# Patient Record
Sex: FEMALE | Race: WHITE | NOT HISPANIC OR LATINO | Employment: UNEMPLOYED | URBAN - METROPOLITAN AREA
[De-identification: names, ages, dates, MRNs, and addresses within clinical notes are randomized per-mention and may not be internally consistent; named-entity substitution may affect disease eponyms.]

---

## 2022-08-29 ENCOUNTER — OFFICE VISIT (OUTPATIENT)
Dept: FAMILY MEDICINE CLINIC | Facility: CLINIC | Age: 62
End: 2022-08-29

## 2022-08-29 VITALS
BODY MASS INDEX: 19.5 KG/M2 | RESPIRATION RATE: 20 BRPM | TEMPERATURE: 97.8 F | WEIGHT: 114.2 LBS | OXYGEN SATURATION: 96 % | HEIGHT: 64 IN | SYSTOLIC BLOOD PRESSURE: 130 MMHG | HEART RATE: 90 BPM | DIASTOLIC BLOOD PRESSURE: 80 MMHG

## 2022-08-29 DIAGNOSIS — F17.210 CIGARETTE SMOKER: ICD-10-CM

## 2022-08-29 DIAGNOSIS — M25.552 LEFT HIP PAIN: Primary | ICD-10-CM

## 2022-08-29 DIAGNOSIS — Z12.11 SCREEN FOR COLON CANCER: ICD-10-CM

## 2022-08-29 PROCEDURE — 99203 OFFICE O/P NEW LOW 30 MIN: CPT | Performed by: FAMILY MEDICINE

## 2022-08-29 RX ORDER — MELOXICAM 7.5 MG/1
7.5 TABLET ORAL DAILY
Qty: 30 TABLET | Refills: 5 | Status: SHIPPED | OUTPATIENT
Start: 2022-08-29 | End: 2023-03-15

## 2022-08-29 RX ORDER — NAPROXEN 250 MG/1
250 TABLET ORAL 2 TIMES DAILY PRN
COMMUNITY
End: 2022-08-29

## 2022-08-29 RX ORDER — DIPHENHYDRAMINE HCL 25 MG
25 CAPSULE ORAL EVERY 6 HOURS PRN
COMMUNITY
End: 2022-08-29

## 2022-08-29 NOTE — PROGRESS NOTES
"Chief Complaint   Patient presents with   • NP / establish PCP    • chronic L hip pain / past fracture        Subjective      Betsy Ureña is a 62 y.o. who presents for left hip pain related to fall with subsequent left KIANA in 2020. I am not sure patient had adequate follow up. She admits fear over what she can and can not do due to her surgery. She is afraid to move the leg at times    She has a 40 michelet year history of smoking. She believes she is up to date on lung cancer screening.     She has never had a colonoscopy and declines this due to history of extensive vaginal surgeries.         The following portions of the patient's history were reviewed and updated as appropriate: allergies, current medications, past family history, past medical history, past social history, past surgical history and problem list.    Review of Systems    Objective   Vital Signs:  /80   Pulse 90   Temp 97.8 °F (36.6 °C)   Resp 20   Ht 162.6 cm (64\")   Wt 51.8 kg (114 lb 3.2 oz)   SpO2 96%   BMI 19.60 kg/m²     BMI is within normal parameters. No other follow-up for BMI required.        Physical Exam  Cardiovascular:      Rate and Rhythm: Normal rate and regular rhythm.      Pulses: Normal pulses.      Heart sounds: Normal heart sounds.   Pulmonary:      Effort: Pulmonary effort is normal.      Breath sounds: Normal breath sounds.   Musculoskeletal:      Left hip: No tenderness or bony tenderness. Decreased range of motion.   Neurological:      Mental Status: She is alert.          Result Review                     Assessment and Plan  Diagnoses and all orders for this visit:    1. Left hip pain (Primary)  Comments:  Given once daily meloxicam    Orders:  -     meloxicam (Mobic) 7.5 MG tablet; Take 1 tablet by mouth Daily.  Dispense: 30 tablet; Refill: 5    2. Cigarette smoker  Comments:  Patient believes she is up to date on Lung Cancer Screening    3. Screen for colon cancer  -     Cologuard - Stool, Per Rectum; " Future      Obtain old records        Follow Up  No follow-ups on file.  Patient was given instructions and counseling regarding her condition or for health maintenance advice. Please see specific information pulled into the AVS if appropriate.

## 2023-03-15 DIAGNOSIS — M25.552 LEFT HIP PAIN: ICD-10-CM

## 2023-03-15 RX ORDER — MELOXICAM 7.5 MG/1
TABLET ORAL
Qty: 30 TABLET | Refills: 5 | Status: SHIPPED | OUTPATIENT
Start: 2023-03-15

## 2025-07-03 ENCOUNTER — OFFICE VISIT (OUTPATIENT)
Dept: FAMILY MEDICINE CLINIC | Facility: CLINIC | Age: 65
End: 2025-07-03
Payer: COMMERCIAL

## 2025-07-03 VITALS
SYSTOLIC BLOOD PRESSURE: 108 MMHG | BODY MASS INDEX: 17.49 KG/M2 | DIASTOLIC BLOOD PRESSURE: 65 MMHG | WEIGHT: 105 LBS | HEIGHT: 65 IN | TEMPERATURE: 98.1 F | RESPIRATION RATE: 16 BRPM

## 2025-07-03 DIAGNOSIS — Z87.891 PERSONAL HISTORY OF NICOTINE DEPENDENCE: ICD-10-CM

## 2025-07-03 DIAGNOSIS — Z12.31 ENCOUNTER FOR SCREENING MAMMOGRAM FOR MALIGNANT NEOPLASM OF BREAST: ICD-10-CM

## 2025-07-03 DIAGNOSIS — D17.22 LIPOMA OF LEFT UPPER EXTREMITY: Primary | ICD-10-CM

## 2025-07-03 DIAGNOSIS — Z12.2 ENCOUNTER FOR SCREENING FOR LUNG CANCER: ICD-10-CM

## 2025-07-03 DIAGNOSIS — R23.3 SPONTANEOUS ECCHYMOSES: ICD-10-CM

## 2025-07-03 NOTE — PROGRESS NOTES
"Chief Complaint   Patient presents with    \"knot\" on L humeral area      For the past month / seen at ER for this        Subjective      Betsy Ureña is a 64 y.o. who presents for a palpable lesion on the left upper extremity that has been present for several months.  Patient was evaluated at an emergency room and was told the lesion was a lipoma although she apparently misunderstood this and thought she was told it was either a cyst or a tumor.  She comes in for reexamination believing she may need surgical referral.    Patient continues to smoke cigarettes although is now smoking 1/2 pack of cigarettes per day.  She is not interested in quitting.    She is overdue for mammogram as well.    Patient also brings up the fact that she believes she is bruising rather easily.  She cites the appearance of a bruise on her right knee as well as her left elbow.  She denies nosebleeds, bleeding gums, hematuria, melena or hematochezia    Objective   Vital Signs:  /65   Temp 98.1 °F (36.7 °C)   Resp 16   Ht 163.8 cm (64.5\")   Wt 47.6 kg (105 lb)   BMI 17.74 kg/m²     Physical Exam  Vitals reviewed.   Constitutional:       Appearance: Normal appearance.   Skin:     Comments: Patient has a bruise on the left elbow and the right knee.  No petechial or purpura are present.  On the left upper extremity in the tricep area she has a soft, rubbery, mobile 2.5 cm lesion that is nontender and without fluctuance   Neurological:      Mental Status: She is alert.          Result Review                     Assessment and Plan  Diagnoses and all orders for this visit:    1. Lipoma of left upper extremity (Primary)    2. Spontaneous ecchymoses    3. Encounter for screening mammogram for malignant neoplasm of breast  -     Mammo Screening Digital Tomosynthesis Bilateral With CAD; Future    4. Encounter for screening for lung cancer  -      CT Chest Low Dose Cancer Screening WO; Future    5. Personal history of nicotine dependence  -     "  CT Chest Low Dose Cancer Screening WO; Future    Plan  1.  Patient's lipoma is new.  She was reassured this is not a dangerous lesion.  She will return in 4 months for reassessment and remeasuring  2.  Patient was reassured that her spontaneous ecchymosis are actually bruises from trauma  3.  Patient will complete health screenings of low-dose CT scan of the chest and mammogram locally at [facility]        Follow Up  Return in about 4 months (around 11/3/2025) for Recheck right arm lipoma.  Patient was given instructions and counseling regarding her condition or for health maintenance advice. Please see specific information pulled into the AVS if appropriate.

## 2025-08-08 DIAGNOSIS — Z12.2 ENCOUNTER FOR SCREENING FOR LUNG CANCER: ICD-10-CM

## 2025-08-08 DIAGNOSIS — Z87.891 PERSONAL HISTORY OF NICOTINE DEPENDENCE: ICD-10-CM
